# Patient Record
Sex: MALE | Race: NATIVE HAWAIIAN OR OTHER PACIFIC ISLANDER
[De-identification: names, ages, dates, MRNs, and addresses within clinical notes are randomized per-mention and may not be internally consistent; named-entity substitution may affect disease eponyms.]

---

## 2019-01-14 ENCOUNTER — HOSPITAL ENCOUNTER (EMERGENCY)
Dept: HOSPITAL 31 - C.ER | Age: 39
Discharge: HOME | End: 2019-01-14
Payer: COMMERCIAL

## 2019-01-14 VITALS — BODY MASS INDEX: 22.6 KG/M2

## 2019-01-14 VITALS
TEMPERATURE: 97.9 F | HEART RATE: 79 BPM | RESPIRATION RATE: 18 BRPM | SYSTOLIC BLOOD PRESSURE: 110 MMHG | DIASTOLIC BLOOD PRESSURE: 72 MMHG | OXYGEN SATURATION: 99 %

## 2019-01-14 DIAGNOSIS — H81.399: Primary | ICD-10-CM

## 2019-01-14 LAB
ALBUMIN SERPL-MCNC: 5 G/DL (ref 3.5–5)
ALBUMIN/GLOB SERPL: 1.7 {RATIO} (ref 1–2.1)
ALT SERPL-CCNC: 54 U/L (ref 21–72)
AST SERPL-CCNC: 50 U/L (ref 17–59)
BASOPHILS # BLD AUTO: 0 K/UL (ref 0–0.2)
BASOPHILS NFR BLD: 0.4 % (ref 0–2)
BILIRUB UR-MCNC: NEGATIVE MG/DL
BUN SERPL-MCNC: 13 MG/DL (ref 9–20)
CALCIUM SERPL-MCNC: 9.3 MG/DL (ref 8.6–10.4)
EOSINOPHIL # BLD AUTO: 0.1 K/UL (ref 0–0.7)
EOSINOPHIL NFR BLD: 0.8 % (ref 0–4)
ERYTHROCYTE [DISTWIDTH] IN BLOOD BY AUTOMATED COUNT: 12.5 % (ref 11.5–14.5)
GFR NON-AFRICAN AMERICAN: > 60
GLUCOSE UR STRIP-MCNC: (no result) MG/DL
HGB BLD-MCNC: 15.5 G/DL (ref 12–18)
LEUKOCYTE ESTERASE UR-ACNC: (no result) LEU/UL
LYMPHOCYTES # BLD AUTO: 1.7 K/UL (ref 1–4.3)
LYMPHOCYTES NFR BLD AUTO: 17.8 % (ref 20–40)
MCH RBC QN AUTO: 29.6 PG (ref 27–31)
MCHC RBC AUTO-ENTMCNC: 33.2 G/DL (ref 33–37)
MCV RBC AUTO: 89.1 FL (ref 80–94)
MONOCYTES # BLD: 0.4 K/UL (ref 0–0.8)
MONOCYTES NFR BLD: 4.4 % (ref 0–10)
NEUTROPHILS # BLD: 7.2 K/UL (ref 1.8–7)
NEUTROPHILS NFR BLD AUTO: 76.6 % (ref 50–75)
NRBC BLD AUTO-RTO: 0.1 % (ref 0–2)
PH UR STRIP: 7 [PH] (ref 5–8)
PLATELET # BLD: 238 K/UL (ref 130–400)
PMV BLD AUTO: 9.8 FL (ref 7.2–11.7)
PROT UR STRIP-MCNC: NEGATIVE MG/DL
RBC # BLD AUTO: 5.23 MIL/UL (ref 4.4–5.9)
RBC # UR STRIP: NEGATIVE /UL
SP GR UR STRIP: 1.02 (ref 1–1.03)
SQUAMOUS EPITHIAL: < 1 /HPF (ref 0–5)
UROBILINOGEN UR-MCNC: NORMAL MG/DL (ref 0.2–1)
WBC # BLD AUTO: 9.4 K/UL (ref 4.8–10.8)

## 2019-01-14 PROCEDURE — 96374 THER/PROPH/DIAG INJ IV PUSH: CPT

## 2019-01-14 PROCEDURE — 81001 URINALYSIS AUTO W/SCOPE: CPT

## 2019-01-14 PROCEDURE — 80346 BENZODIAZEPINES1-12: CPT

## 2019-01-14 PROCEDURE — 80361 OPIATES 1 OR MORE: CPT

## 2019-01-14 PROCEDURE — 85025 COMPLETE CBC W/AUTO DIFF WBC: CPT

## 2019-01-14 PROCEDURE — 83992 ASSAY FOR PHENCYCLIDINE: CPT

## 2019-01-14 PROCEDURE — 80353 DRUG SCREENING COCAINE: CPT

## 2019-01-14 PROCEDURE — 80053 COMPREHEN METABOLIC PANEL: CPT

## 2019-01-14 PROCEDURE — 96375 TX/PRO/DX INJ NEW DRUG ADDON: CPT

## 2019-01-14 PROCEDURE — 99285 EMERGENCY DEPT VISIT HI MDM: CPT

## 2019-01-14 PROCEDURE — 96361 HYDRATE IV INFUSION ADD-ON: CPT

## 2019-01-14 PROCEDURE — 80349 CANNABINOIDS NATURAL: CPT

## 2019-01-14 PROCEDURE — 80358 DRUG SCREENING METHADONE: CPT

## 2019-01-14 PROCEDURE — 70450 CT HEAD/BRAIN W/O DYE: CPT

## 2019-01-14 PROCEDURE — 80324 DRUG SCREEN AMPHETAMINES 1/2: CPT

## 2019-01-14 PROCEDURE — 80345 DRUG SCREENING BARBITURATES: CPT

## 2019-01-14 NOTE — C.PDOC
History Of Present Illness


 38 year old male who presents to the ED c/o dizziness (room spinning around 

him) with associated nausea and vomiting since 8am this morning. Patient states 

that his symptoms worsen with head movements and that he has had similar milder 

symptoms in the past 1 week, but they became more severe today. He denies any 

visual changes, facial droop, slurred speech, extremity weakness, sensory 

changes, CP, SOB, and palpitations. 


Time Seen by Provider: 01/14/19 09:58


Chief Complaint (Nursing): Dizziness/Lightheaded


History Per: Patient


History/Exam Limitations: no limitations


Onset/Duration Of Symptoms: Hrs (8am )


Current Symptoms Are (Timing): Still Present


Fall Associated With With Symptoms: No


Recent travel outside of the [place]: No


Additional History Per: Patient





Past Medical History


Reviewed: Historical Data, Nursing Documentation, Vital Signs





- Medical History


PMH: No Chronic Diseases


Surgical History: No Surg Hx


Family History: States: Unknown Family Hx





- Social History


Hx Alcohol Use: No


Hx Substance Use: No





- Immunization History


Hx Tetanus Toxoid Vaccination: No


Hx Influenza Vaccination: No


Hx Pneumococcal Vaccination: No





Review Of Systems


Cardiovascular: Negative for: Chest Pain, Palpitations


Respiratory: Negative for: Shortness of Breath


Gastrointestinal: Positive for: Nausea, Vomiting


Neurological: Positive for: Dizziness.  Negative for: Weakness (extremity ), 

Change in Speech (slurred), Other (visual changes, facial droop or sensory 

changes)





Physical Exam





- Physical Exam


Appears: Well, Other (uncomfortable )


Skin: Normal Color, Warm, Dry


Head: Atraumatic, Normacephalic


Eye(s): bilateral: PERRL, EOMI, Other (horizontal nystagmus )


Oral Mucosa: Moist


Neck: Normal ROM, Supple


Chest: Symmetrical, No Deformity


Cardiovascular: Rhythm Regular, No Murmur


Respiratory: Normal Breath Sounds, No Rales, No Rhonchi, No Wheezing


Gastrointestinal/Abdominal: Normal Exam, Bowel Sounds, Soft, No Tenderness


Extremity: Normal ROM


Extremity: Bilateral: Atraumatic


Neurological/Psych: Oriented x3, Normal Speech, Normal Cognition, Normal Cranial

Nerves, No Cerebellar Signs, Normal Motor, Normal Sensation





ED Course And Treatment





- Laboratory Results


Result Diagrams: 


                                 01/14/19 11:14





                                 01/14/19 11:14


Lab Results: 





                                        











Total Bilirubin  0.9 mg/dL (0.2-1.3)   01/14/19  11:14    


 


AST  50 U/L (17-59)   01/14/19  11:14    


 


ALT  54 U/L (21-72)   01/14/19  11:14    


 


Alkaline Phosphatase  65 U/L ()   01/14/19  11:14    


 


Total Protein  8.0 g/dL (6.3-8.3)   01/14/19  11:14    


 


Albumin  5.0 g/dL (3.5-5.0)   01/14/19  11:14    


 


Globulin  3.0 gm/dL (2.2-3.9)   01/14/19  11:14    


 


Albumin/Globulin Ratio  1.7  (1.0-2.1)   01/14/19  11:14    











O2 Sat by Pulse Oximetry: 100 (RA)


Pulse Ox Interpretation: Normal





- CT Scan/US


  ** CAT Head


Other Rad Studies (CT/US): Read By Radiologist, Radiology Report Reviewed


Progress Note: Blood work, UA, UDS, CT head ordered and reviewed.  Patient given

IV NS bolus, PO Meclizine, IV zofran.  On reassessment, patient improved but 

still has mild dizziness - IV NS bolus, IV ativan given.





Disposition





- Disposition


Referrals: 


Behin,Babak, MD [Staff Provider] - 


Disposition Time: 13:00


Condition: STABLE


Prescriptions: 


Meclizine [Meclizine*] 25 mg PO Q6 #20 tab


Forms:  [facility] (English)





- Clinical Impression


Clinical Impression: 


 Peripheral vertigo








- Scribe Statement


The provider has reviewed the documentation as recorded by the Nik Pastrana 





All medical record entries made by the Scribe were at my direction and 

personally dictated by me. I have reviewed the chart and agree that the record 

accurately reflects my personal performance of the history, physical exam, 

medical decision making, and the department course for this patient. I have also

 personally directed, reviewed, and agree with the discharge instructions and 

disposition.








Physician Patient Turnover


Patient Signed Over To: Marianna Bhakta


Handoff Comments: pending reassessment after IV fluids, IV ativan

## 2019-01-14 NOTE — CT
Date of service: 01/14/2019



PROCEDURE:  CT HEAD WITHOUT CONTRAST.



HISTORY:

SEVERE VERTIGO, NYSTAGMUS



COMPARISON:

None available



TECHNIQUE:

Axial computed tomography images were obtained through the head/brain 

without intravenous contrast.  



Radiation dose:



Total exam DLP = 1161.62 mGy-cm.



This CT exam was performed using one or more of the following dose 

reduction techniques: Automated exposure control, adjustment of the 

mA and/or kV according to patient size, and/or use of iterative 

reconstruction technique.



FINDINGS:



HEMORRHAGE:

No intracranial hemorrhage. 



BRAIN:

No mass effect or edema. The gray-white matter differentiation 

appears intact. Please note that MRI with diffusion imaging is more 

sensitive in the detection of acute ischemic event.



VENTRICLES:

No hydrocephalus. 



CALVARIUM:

Unremarkable.



PARANASAL SINUSES:

Unremarkable as visualized. No significant inflammatory changes.



MASTOID AIR CELLS:

Unremarkable as visualized. No inflammatory changes.



OTHER FINDINGS:

None.



IMPRESSION:

No acute intracranial pathology identified.